# Patient Record
Sex: FEMALE | Race: WHITE | NOT HISPANIC OR LATINO | URBAN - METROPOLITAN AREA
[De-identification: names, ages, dates, MRNs, and addresses within clinical notes are randomized per-mention and may not be internally consistent; named-entity substitution may affect disease eponyms.]

---

## 2023-07-25 ENCOUNTER — INPATIENT HOSPITAL (OUTPATIENT)
Dept: URBAN - METROPOLITAN AREA HOSPITAL 138 | Facility: HOSPITAL | Age: 73
End: 2023-07-25
Payer: MEDICAID

## 2023-07-25 DIAGNOSIS — E72.20 DISORDER OF UREA CYCLE METABOLISM, UNSPECIFIED: ICD-10-CM

## 2023-07-25 DIAGNOSIS — G93.41 METABOLIC ENCEPHALOPATHY: ICD-10-CM

## 2023-07-25 PROCEDURE — 99222 1ST HOSP IP/OBS MODERATE 55: CPT | Mod: GC | Performed by: INTERNAL MEDICINE

## 2023-07-26 PROCEDURE — 99233 SBSQ HOSP IP/OBS HIGH 50: CPT | Mod: GC | Performed by: INTERNAL MEDICINE

## 2023-07-27 ENCOUNTER — INPATIENT HOSPITAL (OUTPATIENT)
Dept: URBAN - METROPOLITAN AREA HOSPITAL 138 | Facility: HOSPITAL | Age: 73
End: 2023-07-27
Payer: MEDICAID

## 2023-07-27 DIAGNOSIS — E72.20 DISORDER OF UREA CYCLE METABOLISM, UNSPECIFIED: ICD-10-CM

## 2023-07-27 PROCEDURE — 99232 SBSQ HOSP IP/OBS MODERATE 35: CPT | Mod: GC | Performed by: INTERNAL MEDICINE

## 2023-07-28 ENCOUNTER — INPATIENT HOSPITAL (OUTPATIENT)
Dept: URBAN - METROPOLITAN AREA HOSPITAL 138 | Facility: HOSPITAL | Age: 73
End: 2023-07-28
Payer: MEDICAID

## 2023-07-28 DIAGNOSIS — G93.40 ENCEPHALOPATHY, UNSPECIFIED: ICD-10-CM

## 2023-07-28 DIAGNOSIS — E72.20 DISORDER OF UREA CYCLE METABOLISM, UNSPECIFIED: ICD-10-CM

## 2023-07-28 PROCEDURE — 99232 SBSQ HOSP IP/OBS MODERATE 35: CPT | Mod: GC | Performed by: INTERNAL MEDICINE

## 2023-07-29 PROCEDURE — 99232 SBSQ HOSP IP/OBS MODERATE 35: CPT | Mod: GC | Performed by: INTERNAL MEDICINE

## 2023-07-31 ENCOUNTER — INPATIENT HOSPITAL (OUTPATIENT)
Dept: URBAN - METROPOLITAN AREA HOSPITAL 138 | Facility: HOSPITAL | Age: 73
End: 2023-07-31
Payer: MEDICAID

## 2023-07-31 DIAGNOSIS — E72.20 DISORDER OF UREA CYCLE METABOLISM, UNSPECIFIED: ICD-10-CM

## 2023-07-31 PROCEDURE — 99232 SBSQ HOSP IP/OBS MODERATE 35: CPT | Mod: GC | Performed by: INTERNAL MEDICINE

## 2023-09-17 ENCOUNTER — INPATIENT HOSPITAL (OUTPATIENT)
Dept: URBAN - METROPOLITAN AREA HOSPITAL 138 | Facility: HOSPITAL | Age: 73
End: 2023-09-17
Payer: MEDICAID

## 2023-09-17 DIAGNOSIS — N20.0 CALCULUS OF KIDNEY: ICD-10-CM

## 2023-09-17 PROCEDURE — 99222 1ST HOSP IP/OBS MODERATE 55: CPT | Mod: GC | Performed by: INTERNAL MEDICINE

## 2023-09-18 ENCOUNTER — INPATIENT HOSPITAL (OUTPATIENT)
Dept: URBAN - METROPOLITAN AREA HOSPITAL 138 | Facility: HOSPITAL | Age: 73
End: 2023-09-18
Payer: MEDICAID

## 2023-09-18 DIAGNOSIS — K57.30 DIVERTICULOSIS OF LARGE INTESTINE WITHOUT PERFORATION OR ABS: ICD-10-CM

## 2023-09-18 DIAGNOSIS — N20.0 CALCULUS OF KIDNEY: ICD-10-CM

## 2023-09-18 DIAGNOSIS — D12.0 BENIGN NEOPLASM OF CECUM: ICD-10-CM

## 2023-09-18 DIAGNOSIS — D12.2 BENIGN NEOPLASM OF ASCENDING COLON: ICD-10-CM

## 2023-09-18 DIAGNOSIS — D12.3 BENIGN NEOPLASM OF TRANSVERSE COLON: ICD-10-CM

## 2023-09-18 PROCEDURE — 99232 SBSQ HOSP IP/OBS MODERATE 35: CPT | Mod: GC | Performed by: INTERNAL MEDICINE

## 2023-09-19 PROCEDURE — 45385 COLONOSCOPY W/LESION REMOVAL: CPT | Mod: GC | Performed by: INTERNAL MEDICINE

## 2023-09-19 PROCEDURE — 45380 COLONOSCOPY AND BIOPSY: CPT | Mod: GC,59 | Performed by: INTERNAL MEDICINE

## 2023-09-19 PROCEDURE — 45380 COLONOSCOPY AND BIOPSY: CPT | Mod: 59,GC | Performed by: INTERNAL MEDICINE

## 2023-11-28 ENCOUNTER — OFFICE (OUTPATIENT)
Dept: URBAN - METROPOLITAN AREA CLINIC 16 | Facility: CLINIC | Age: 73
End: 2023-11-28
Payer: MEDICAID

## 2023-11-28 VITALS — WEIGHT: 173 LBS | HEART RATE: 68 BPM | OXYGEN SATURATION: 97 % | HEIGHT: 63 IN

## 2023-11-28 DIAGNOSIS — Z86.010 PERSONAL HISTORY OF COLONIC POLYPS: ICD-10-CM

## 2023-11-28 PROCEDURE — 99214 OFFICE O/P EST MOD 30 MIN: CPT | Performed by: INTERNAL MEDICINE

## 2023-11-28 NOTE — SERVICEHPINOTES
Renee Andrew   is seen today for a follow-up visit.     Renee was last seen in the hospital on September 17, 2023. She was seen by my partners Dr. Ruvalcaba at the hospital and noted to have a history of large perinephric abscess that it could have been related to large renal calculi and status post IR cutaneous drain placement she had a CAT scan on September 5 showing concern for fluid collection in the right lower quadrant demonstrating a fistulous connection to the skin surface on the renal cortex as well as the cecum and concern for fistulous connection to the urinary bladder she had history of right nephrostomy tube placement. And GI was consulted for colonoscopy at that time. She does have history of A-fib on anticoagulation.On her colonoscopy she had a many colon polyps of the cecum ileocecal valve ascending colon transverse colon etc. and recommended for short-term surveillance colonoscopy in 1 year. She may benefit given some of her comorbidities or colonoscopy at the hospital at that timebrI reviewed her old hospital records and she did have admission for altered mental status back in July and noted that time urological issues with stable changes of the right kidney associated with definitive granulomatous pyelonephritis and mild decreasing inflammatory change around the right kidney/right lower abdominal and pelvic fluid collection decreased in size on the CAT scan of the abdomen and pelvis she had a metabolic encephalopathy at that time and had been seen by Dr. Donald there was some concern for risk for Neal liver disease as wellI reviewed the colonoscopy report unrevealing for fistula and other finding with evaluation noted per concern for fistula connection at the level of cecum with history of right perinephric abscess and IR drainage
br
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 She did mention in addition for history that she has plan for cholecystectomy with gallstones x2 and with concern for prior abscess the plan had been to for cholecystectomy at  [facility] calm

## 2023-11-28 NOTE — SERVICENOTES
She had hospitalization and concerning findings and with history of perinephric abscess and issues we will also have evaluation at [facility] and cholecystectomy is planned already.  She may see our group as needed with colonoscopy for the incidental finding of multiple polyps in 1 year at the hospital.

## 2024-02-12 ENCOUNTER — OFFICE (OUTPATIENT)
Dept: URBAN - METROPOLITAN AREA CLINIC 16 | Facility: CLINIC | Age: 74
End: 2024-02-12
Payer: MEDICAID

## 2024-02-12 VITALS
WEIGHT: 174 LBS | HEIGHT: 63 IN | OXYGEN SATURATION: 94 % | HEART RATE: 61 BPM | SYSTOLIC BLOOD PRESSURE: 124 MMHG | DIASTOLIC BLOOD PRESSURE: 72 MMHG

## 2024-02-12 DIAGNOSIS — Z86.010 PERSONAL HISTORY OF COLONIC POLYPS: ICD-10-CM

## 2024-02-12 DIAGNOSIS — K57.90 DIVERTICULOSIS OF INTESTINE, PART UNSPECIFIED, WITHOUT PERFO: ICD-10-CM

## 2024-02-12 DIAGNOSIS — N18.9 CHRONIC KIDNEY DISEASE, UNSPECIFIED: ICD-10-CM

## 2024-02-12 PROCEDURE — 99214 OFFICE O/P EST MOD 30 MIN: CPT | Performed by: INTERNAL MEDICINE

## 2024-02-12 NOTE — SERVICENOTES
She will have colonoscopy as planned in September 2024 and routine follow-up at this time.  If she is unable to evacuate her bowels well she should use prune juice applesauce and bran or even a stool softener to help with passage of stool that may be difficult otherwise

## 2024-02-12 NOTE — SERVICEHPINOTES
Renee Andrew   is seen today for a follow-up visit.     Renee resides at the [facility] and had been seen by Dr. Arthur back in July of last year and she had some hyperammonemia and encephalopathy and was given lactulose at that time and antibiotics from an infectious disease standpoint as she had pyelonephritis in the peritoneal abscess and drainage in the past. She has hypertension and diabetes otherwise and now her hemoglobin A1c is 5.1 I did review her records from September as well and she was able to have evaluation for consult for colonoscopy at that time there was a large perianal abscess related to large renal calculi and then had IR guided cutaneous drain placement and a CAT scan on September 5 concerning for a connection i.e. fistulous connection between the skin surface and renal cortex as well as the cecum. She had a right nephrostomy tube accordingly at that time I was able to review the colon exam and no fistula was seen but she had 13 polyps. She was recommended for colonoscopy therefore no later than 1 year I was able to review the pathology and all the polyps were precancerous and adenomatousI had seen her in follow-up given the abnormal findings and short-term surveillance recommended again no later than 1 year. The examination date will need to be by September 2024. She has no abnormal drainage from history of large peritoneal abscess. She may have some scarring otherwise

## 2024-05-30 ENCOUNTER — OFFICE (OUTPATIENT)
Dept: URBAN - METROPOLITAN AREA CLINIC 16 | Facility: CLINIC | Age: 74
End: 2024-05-30
Payer: MEDICAID

## 2024-05-30 VITALS
SYSTOLIC BLOOD PRESSURE: 138 MMHG | HEIGHT: 63 IN | WEIGHT: 184 LBS | DIASTOLIC BLOOD PRESSURE: 86 MMHG | HEART RATE: 50 BPM

## 2024-05-30 DIAGNOSIS — K76.0 FATTY (CHANGE OF) LIVER, NOT ELSEWHERE CLASSIFIED: ICD-10-CM

## 2024-05-30 DIAGNOSIS — Z89.519 ACQUIRED ABSENCE OF UNSPECIFIED LEG BELOW KNEE: ICD-10-CM

## 2024-05-30 DIAGNOSIS — E66.9 OBESITY, UNSPECIFIED: ICD-10-CM

## 2024-05-30 DIAGNOSIS — Z86.010 PERSONAL HISTORY OF COLONIC POLYPS: ICD-10-CM

## 2024-05-30 PROCEDURE — 99214 OFFICE O/P EST MOD 30 MIN: CPT | Performed by: INTERNAL MEDICINE

## 2024-05-30 NOTE — SERVICEHPINOTES
Renee Andrew   is seen today for a follow-up visit.     Renee was seen in February and has complicating history of perineal abscess and drainage in the past and had pyelonephritis and had been in convalescence at the [facility]. In the past she had large perianal abscess and large renal calculi and IR guided cutaneous drain in September 2023 with fistulous connection between the skin surface and renal cortex as well as the cecum she had resultant right nephrostomy tube accordingly at that time. The examination from colon standpoint had no fistula to be seen but she had 13 polyps and recommended to have a colonoscopy with comorbidities in the hospital in September 2024. She has CHF morbid obesity schizoaffective disorder and diabetes to be complicating
br
Marley did have records today and I was able to review her ultrasound that was done on May 15 and she did have cholecystectomy anatomy and diffuse hepatic steatosis. She likely has some fatty liver related to being overweight and having history of diabetes. She has a good hemoglobin A1c now 5.1. I did discuss the management of fatty liver which should include low-fat low-cholesterol and good monitoring of her blood sugar and no sugar added diet. She is not a drinker and has no other risks for cirrhosis. If her liver tests worsen or findings concerning in the future fibrosis scan may be in order.

## 2024-12-18 NOTE — SERVICENOTES
We discussed management of fatty liver per ideal body weight low-cholesterol low-fat and good management of blood sugar diabetes has one of the biggest impact on fatty liver and development of liver disease accordingly and she will need to be monitored and no added sugar diet long-term 17-Dec-2024